# Patient Record
Sex: MALE | Race: WHITE | NOT HISPANIC OR LATINO | Employment: OTHER | URBAN - METROPOLITAN AREA
[De-identification: names, ages, dates, MRNs, and addresses within clinical notes are randomized per-mention and may not be internally consistent; named-entity substitution may affect disease eponyms.]

---

## 2017-01-02 ENCOUNTER — APPOINTMENT (OUTPATIENT)
Dept: CARDIAC REHAB | Age: 57
End: 2017-01-02
Payer: COMMERCIAL

## 2017-01-02 PROCEDURE — 93798 PHYS/QHP OP CAR RHAB W/ECG: CPT

## 2017-01-04 ENCOUNTER — APPOINTMENT (OUTPATIENT)
Dept: CARDIAC REHAB | Age: 57
End: 2017-01-04
Payer: COMMERCIAL

## 2017-01-04 PROCEDURE — 93798 PHYS/QHP OP CAR RHAB W/ECG: CPT

## 2017-01-06 ENCOUNTER — APPOINTMENT (OUTPATIENT)
Dept: CARDIAC REHAB | Age: 57
End: 2017-01-06
Payer: COMMERCIAL

## 2017-01-06 PROCEDURE — 93798 PHYS/QHP OP CAR RHAB W/ECG: CPT

## 2017-01-09 ENCOUNTER — APPOINTMENT (OUTPATIENT)
Dept: CARDIAC REHAB | Age: 57
End: 2017-01-09
Payer: COMMERCIAL

## 2017-01-09 ENCOUNTER — GENERIC CONVERSION - ENCOUNTER (OUTPATIENT)
Dept: OTHER | Facility: OTHER | Age: 57
End: 2017-01-09

## 2017-01-09 PROCEDURE — 93798 PHYS/QHP OP CAR RHAB W/ECG: CPT

## 2017-01-11 ENCOUNTER — APPOINTMENT (OUTPATIENT)
Dept: CARDIAC REHAB | Age: 57
End: 2017-01-11
Payer: COMMERCIAL

## 2017-01-11 PROCEDURE — 93798 PHYS/QHP OP CAR RHAB W/ECG: CPT

## 2017-01-13 ENCOUNTER — APPOINTMENT (OUTPATIENT)
Dept: CARDIAC REHAB | Age: 57
End: 2017-01-13
Payer: COMMERCIAL

## 2017-01-13 PROCEDURE — 93798 PHYS/QHP OP CAR RHAB W/ECG: CPT

## 2017-01-16 ENCOUNTER — APPOINTMENT (OUTPATIENT)
Dept: CARDIAC REHAB | Age: 57
End: 2017-01-16
Payer: COMMERCIAL

## 2017-01-16 PROCEDURE — 93798 PHYS/QHP OP CAR RHAB W/ECG: CPT

## 2017-01-18 ENCOUNTER — APPOINTMENT (OUTPATIENT)
Dept: CARDIAC REHAB | Age: 57
End: 2017-01-18
Payer: COMMERCIAL

## 2017-01-18 PROCEDURE — 93798 PHYS/QHP OP CAR RHAB W/ECG: CPT

## 2017-01-20 ENCOUNTER — APPOINTMENT (OUTPATIENT)
Dept: CARDIAC REHAB | Age: 57
End: 2017-01-20
Payer: COMMERCIAL

## 2017-01-20 PROCEDURE — 93798 PHYS/QHP OP CAR RHAB W/ECG: CPT

## 2017-01-23 ENCOUNTER — APPOINTMENT (OUTPATIENT)
Dept: CARDIAC REHAB | Age: 57
End: 2017-01-23
Payer: COMMERCIAL

## 2017-01-23 PROCEDURE — 93798 PHYS/QHP OP CAR RHAB W/ECG: CPT

## 2017-01-25 ENCOUNTER — APPOINTMENT (OUTPATIENT)
Dept: CARDIAC REHAB | Age: 57
End: 2017-01-25
Payer: COMMERCIAL

## 2017-01-25 PROCEDURE — 93798 PHYS/QHP OP CAR RHAB W/ECG: CPT

## 2017-01-27 ENCOUNTER — APPOINTMENT (OUTPATIENT)
Dept: CARDIAC REHAB | Age: 57
End: 2017-01-27
Payer: COMMERCIAL

## 2017-01-27 PROCEDURE — 93798 PHYS/QHP OP CAR RHAB W/ECG: CPT

## 2017-01-30 ENCOUNTER — APPOINTMENT (OUTPATIENT)
Dept: CARDIAC REHAB | Age: 57
End: 2017-01-30
Payer: COMMERCIAL

## 2017-01-30 PROCEDURE — 93798 PHYS/QHP OP CAR RHAB W/ECG: CPT

## 2017-02-01 ENCOUNTER — APPOINTMENT (OUTPATIENT)
Dept: CARDIAC REHAB | Age: 57
End: 2017-02-01
Payer: COMMERCIAL

## 2017-02-01 PROCEDURE — 93798 PHYS/QHP OP CAR RHAB W/ECG: CPT

## 2017-02-03 ENCOUNTER — APPOINTMENT (OUTPATIENT)
Dept: CARDIAC REHAB | Age: 57
End: 2017-02-03
Payer: COMMERCIAL

## 2017-02-03 PROCEDURE — 93798 PHYS/QHP OP CAR RHAB W/ECG: CPT

## 2017-02-06 ENCOUNTER — APPOINTMENT (OUTPATIENT)
Dept: CARDIAC REHAB | Age: 57
End: 2017-02-06
Payer: COMMERCIAL

## 2017-02-06 PROCEDURE — 93798 PHYS/QHP OP CAR RHAB W/ECG: CPT

## 2017-02-08 ENCOUNTER — APPOINTMENT (OUTPATIENT)
Dept: CARDIAC REHAB | Age: 57
End: 2017-02-08
Payer: COMMERCIAL

## 2017-02-08 PROCEDURE — 93798 PHYS/QHP OP CAR RHAB W/ECG: CPT

## 2017-02-10 ENCOUNTER — APPOINTMENT (OUTPATIENT)
Dept: CARDIAC REHAB | Age: 57
End: 2017-02-10
Payer: COMMERCIAL

## 2017-02-10 PROCEDURE — 93798 PHYS/QHP OP CAR RHAB W/ECG: CPT

## 2017-02-13 ENCOUNTER — APPOINTMENT (OUTPATIENT)
Dept: CARDIAC REHAB | Age: 57
End: 2017-02-13
Payer: COMMERCIAL

## 2017-02-13 PROCEDURE — 93798 PHYS/QHP OP CAR RHAB W/ECG: CPT

## 2017-02-15 ENCOUNTER — APPOINTMENT (OUTPATIENT)
Dept: CARDIAC REHAB | Age: 57
End: 2017-02-15
Payer: COMMERCIAL

## 2017-02-15 PROCEDURE — 93798 PHYS/QHP OP CAR RHAB W/ECG: CPT

## 2017-02-16 ENCOUNTER — APPOINTMENT (OUTPATIENT)
Dept: CARDIAC REHAB | Age: 57
End: 2017-02-16
Payer: COMMERCIAL

## 2017-02-16 PROCEDURE — 93798 PHYS/QHP OP CAR RHAB W/ECG: CPT

## 2017-02-17 ENCOUNTER — APPOINTMENT (OUTPATIENT)
Dept: CARDIAC REHAB | Age: 57
End: 2017-02-17
Payer: COMMERCIAL

## 2017-02-20 ENCOUNTER — APPOINTMENT (OUTPATIENT)
Dept: CARDIAC REHAB | Age: 57
End: 2017-02-20
Payer: COMMERCIAL

## 2017-02-20 PROCEDURE — 93798 PHYS/QHP OP CAR RHAB W/ECG: CPT

## 2017-02-22 ENCOUNTER — APPOINTMENT (OUTPATIENT)
Dept: CARDIAC REHAB | Age: 57
End: 2017-02-22
Payer: COMMERCIAL

## 2017-02-22 PROCEDURE — 93798 PHYS/QHP OP CAR RHAB W/ECG: CPT

## 2017-02-23 ENCOUNTER — GENERIC CONVERSION - ENCOUNTER (OUTPATIENT)
Dept: OTHER | Facility: OTHER | Age: 57
End: 2017-02-23

## 2017-02-24 ENCOUNTER — APPOINTMENT (OUTPATIENT)
Dept: CARDIAC REHAB | Age: 57
End: 2017-02-24
Payer: COMMERCIAL

## 2017-02-24 PROCEDURE — 93798 PHYS/QHP OP CAR RHAB W/ECG: CPT

## 2017-02-27 ENCOUNTER — APPOINTMENT (OUTPATIENT)
Dept: CARDIAC REHAB | Age: 57
End: 2017-02-27
Payer: COMMERCIAL

## 2017-02-27 ENCOUNTER — GENERIC CONVERSION - ENCOUNTER (OUTPATIENT)
Dept: OTHER | Facility: OTHER | Age: 57
End: 2017-02-27

## 2017-02-27 PROCEDURE — 93798 PHYS/QHP OP CAR RHAB W/ECG: CPT

## 2017-02-28 ENCOUNTER — GENERIC CONVERSION - ENCOUNTER (OUTPATIENT)
Dept: OTHER | Facility: OTHER | Age: 57
End: 2017-02-28

## 2017-03-01 ENCOUNTER — APPOINTMENT (OUTPATIENT)
Dept: CARDIAC REHAB | Age: 57
End: 2017-03-01
Payer: COMMERCIAL

## 2017-03-01 ENCOUNTER — ALLSCRIPTS OFFICE VISIT (OUTPATIENT)
Dept: OTHER | Facility: OTHER | Age: 57
End: 2017-03-01

## 2017-03-01 PROCEDURE — 93798 PHYS/QHP OP CAR RHAB W/ECG: CPT

## 2017-03-03 ENCOUNTER — APPOINTMENT (OUTPATIENT)
Dept: CARDIAC REHAB | Age: 57
End: 2017-03-03
Payer: COMMERCIAL

## 2017-03-06 ENCOUNTER — APPOINTMENT (OUTPATIENT)
Dept: CARDIAC REHAB | Age: 57
End: 2017-03-06
Payer: COMMERCIAL

## 2017-03-06 ENCOUNTER — GENERIC CONVERSION - ENCOUNTER (OUTPATIENT)
Dept: OTHER | Facility: OTHER | Age: 57
End: 2017-03-06

## 2017-09-11 ENCOUNTER — ALLSCRIPTS OFFICE VISIT (OUTPATIENT)
Dept: OTHER | Facility: OTHER | Age: 57
End: 2017-09-11

## 2017-09-11 DIAGNOSIS — E78.5 HYPERLIPIDEMIA: ICD-10-CM

## 2017-10-06 ENCOUNTER — LAB CONVERSION - ENCOUNTER (OUTPATIENT)
Dept: OTHER | Facility: OTHER | Age: 57
End: 2017-10-06

## 2017-10-06 LAB
A/G RATIO (HISTORICAL): 1.7 (CALC) (ref 1–2.5)
ALBUMIN SERPL BCP-MCNC: 4.2 G/DL (ref 3.6–5.1)
ALP SERPL-CCNC: 55 U/L (ref 40–115)
ALT SERPL W P-5'-P-CCNC: 19 U/L (ref 9–46)
AST SERPL W P-5'-P-CCNC: 19 U/L (ref 10–35)
BILIRUB SERPL-MCNC: 1.1 MG/DL (ref 0.2–1.2)
BUN SERPL-MCNC: 17 MG/DL (ref 7–25)
BUN/CREA RATIO (HISTORICAL): NORMAL (CALC) (ref 6–22)
CALCIUM SERPL-MCNC: 9.4 MG/DL (ref 8.6–10.3)
CHLORIDE SERPL-SCNC: 104 MMOL/L (ref 98–110)
CHOLEST SERPL-MCNC: 137 MG/DL
CHOLEST/HDLC SERPL: 3.3 (CALC)
CO2 SERPL-SCNC: 29 MMOL/L (ref 20–31)
CREAT SERPL-MCNC: 1.07 MG/DL (ref 0.7–1.33)
EGFR AFRICAN AMERICAN (HISTORICAL): 90 ML/MIN/1.73M2
EGFR-AMERICAN CALC (HISTORICAL): 78 ML/MIN/1.73M2
GAMMA GLOBULIN (HISTORICAL): 2.5 G/DL (CALC) (ref 1.9–3.7)
GLUCOSE (HISTORICAL): 93 MG/DL (ref 65–99)
HDLC SERPL-MCNC: 41 MG/DL
LDL CHOLESTEROL (HISTORICAL): 75 MG/DL (CALC)
NON-HDL-CHOL (CHOL-HDL) (HISTORICAL): 96 MG/DL (CALC)
POTASSIUM SERPL-SCNC: 4.5 MMOL/L (ref 3.5–5.3)
SODIUM SERPL-SCNC: 138 MMOL/L (ref 135–146)
TOTAL PROTEIN (HISTORICAL): 6.7 G/DL (ref 6.1–8.1)
TRIGL SERPL-MCNC: 119 MG/DL

## 2017-10-10 ENCOUNTER — GENERIC CONVERSION - ENCOUNTER (OUTPATIENT)
Dept: OTHER | Facility: OTHER | Age: 57
End: 2017-10-10

## 2017-10-26 NOTE — PROGRESS NOTES
Assessment  Assessed    1  CAD (coronary artery disease) (414 00) (I25 10)   2  CABG   3  Benign essential hypertension (401 1) (I10)   4  Dyslipidemia (272 4) (E78 5)    Plan  Dyslipidemia    · Atorvastatin Calcium 40 MG Oral Tablet; TAKE 1 TABLET AT BEDTIME   Rx By: Marily Hidalgo; Dispense: 90 Days ; #:90 Tablet; Refill: 3;For: Dyslipidemia; SHERRY = N; Verified Transmission to [employer]; Last Updated By: System, [employer]; 9/11/2017 1:59:35 PM   · (1) COMPREHENSIVE METABOLIC PANEL; Status:Active; Requested for:03Qwf0643;    Perform:[facility]; Due:87Sfc1060; Ordered; For:Dyslipidemia; Ordered By:Pancho Burnette;   · (1) LIPID PANEL FASTING W DIRECT LDL REFLEX; Status:Active; Requested  for:33Uif2307;    Perform:[facility]; Due:50Kpt0030; Ordered; For:Dyslipidemia; Ordered By:Cielo Burnette;   · Follow-up visit in 6 months Evaluation and Treatment  Follow-up  Status: Complete   Done: 41XEN9064   Ordered; For: Dyslipidemia; Ordered By: Marily Hidalgo Performed:  Due: 00JGF1826; Last Updated By: Jhony Graff; 9/11/2017 1:57:04 PM    Discussion/Summary  Cardiology Discussion Summary Free Text Note Form [facility]:   1  Coronary Artery Disease: S/P CABG  LDL is 56  Continue aspirin, metoprolol, and Atorvastatin  He is asymptomatic  Hypertension: Continue Metoprolol  Dyslipidemia: Continue Atorvastatin  LDL is well controlled  LDL 56  Recheck lipids on 40mg of atorvastatin  Counseling Documentation With Imm: The patient was counseled regarding diagnostic results,-- instructions for management,-- risk factor reductions,-- impressions  total time of encounter was 25 minutes-- and-- 15 minutes was spent counseling  Chief Complaint  Chief Complaint Free Text Note Form: Patient is here for a follow up  Patient complaints of occasional sob  History of Present Illness  Cardiology HPI Free Text Note Form [facility]: Followup for CAD  well   No chest pain, no dyspnea, no palpitations  Coronary Artery Disease (Follow-Up): The patient states he has been stable with his coronary artery disease symptoms since the last visit  Symptoms: denies chest pain when at rest,-- denies exertional chest pain,-- denies dyspnea,-- denies fatigue-- and-- denies exercise intolerance  Review of Systems  Cardiology Male ROS:     Cardiac: No complaints of chest pain, no palpitations, no fainiting  Skin: No complaints of nonhealing sores or skin rash  Genitourinary: No complaints of recurrent urinary tract infections, frequent urination at night, difficult urination, blood in urine, kidney stones, loss of bladder control, no kidney or prostate problems, no erectile dysfunction  Psychological: No complaints of feeling depressed, anxiety, panic attacks, or difficulty concentrating  General: No complaints of trouble sleeping, lack of energy, fatigue, appetite changes, weight changes, fever, frequent infections, or night sweats  Respiratory: No complaints of shortness of breath, cough with sputum, or wheezing  HEENT: No complaints of serious problems, hearing problems, nose problems, throat problems, or snoring  Gastrointestinal: No complaints of liver problems, nausea, vomiting, heartburn, constipation, bloody stools, diarrhea, problems swallowing, adbominal pain, or rectal bleeding  Hematologic: No complaints of bleeding disorders, anemia, blood clots, or excessive brusing  Neurological: No complaints of numbness, tingling, dizziness, weakness, seizures, headaches, syncope or fainting, AM fatigue, daytime sleepiness, no witnessed apnea episodes  Musculoskeletal: No complaints of arthritis, back pain, or painfull swelling  ROS Reviewed:   ROS reviewed  Active Problems  Problems    1  Abnormal stress test (794 39) (R94 39)   2  Benign essential hypertension (401 1) (I10)   3  CABG   4  CAD (coronary artery disease) (414 00) (I25 10)   5  Chest pain (786 50) (R07 9)   6  Dyslipidemia (272 4) (E78 5)   7  Hemianopsia (368 46) (H53 47)   8  Postop check (V67 00) (Z09)   9  Shortness of breath (786 05) (R06 02)   10  Snoring (786 09) (R06 83)    Past Medical History  Active Problems And Past Medical History Reviewed: The active problems and past medical history were reviewed and updated today  Surgical History  Problems    1  CABG  Surgical History Reviewed: The surgical history was reviewed and updated today  Family History  Father    1  Family history of hypertension (V17 49) (Z82 49)  Family History    2  Family history of Bladder cancer (188 9) (C67 9)   3  Family history of Cervical cancer (180 9) (C53 9)  Family History Reviewed: The family history was reviewed and updated today  Social History  Problems    · Full-time employment   ·    · Never a smoker   · No alcohol use   · No drug use   · Self-employed  Social History Reviewed: The social history was reviewed and updated today  Current Meds   1  Aspirin 81 MG Oral Tablet Chewable; CHEW 1 TABLET AND SWALLOW ORALLY DAILY   (HYPERTENSION); Therapy: 79AYZ2089 to (Evaluate:84Atm0949); Last LS:68TLV6357 Ordered   2  Atorvastatin Calcium 80 MG Oral Tablet; TAKE 1/2 A TABLET BY MOUTH IN THE   EVENING; Therapy: 87RWH2218 to (Evaluate:50Avq2041)  Requested for: 11Sep2017 Recorded   3  Docusate Sodium 100 MG Oral Capsule Recorded   4  Iron 325 (65 Fe) MG Oral Tablet; TAKE 1 TABLET DAILY; Therapy: 57YFR7668 to Recorded   5  Metoprolol Succinate ER 25 MG Oral Tablet Extended Release 24 Hour; Take 1 tablet   daily; Therapy: 34SLD2295 to (Evaluate:66Kak9076)  Requested for: 18JHY5626; Last   Rx:01Mar2017 Ordered  Medication List Reviewed: The medication list was reviewed and updated today  Allergies  Medication    1   No Known Drug Allergies    Vitals  Vital Signs    Recorded: 11Sep2017 01:40PM   Heart Rate 74   Systolic 705, RUE, Sitting   Diastolic 80, RUE, Sitting   Height 5 ft 5 in Weight 155 lb    BMI Calculated 25 79   BSA Calculated 1 78     Physical Exam    Constitutional   General appearance: No acute distress, well appearing and well nourished  Eyes   Conjunctiva and Sclera examination: Conjunctiva pink, sclera anicteric  Ears, Nose, Mouth, and Throat - Oropharynx: Clear, nares are clear, mucous membranes are moist    Neck   Neck and thyroid: Normal, supple, trachea midline, no thyromegaly  Pulmonary   Respiratory effort: No increased work of breathing or signs of respiratory distress  Auscultation of lungs: Clear to auscultation, no rales, no rhonchi, no wheezing, good air movement  Cardiovascular   Auscultation of heart: Normal rate and rhythm, normal S1 and S2, no murmurs  Carotid pulses: Normal, 2+ bilaterally  Peripheral vascular exam: Normal pulses throughout, no tenderness, erythema or swelling  Pedal pulses: Normal, 2+ bilaterally  Examination of extremities for edema and/or varicosities: Normal     Abdomen   Abdomen: Non-tender and no distention  Liver and spleen: No hepatomegaly or splenomegaly  Musculoskeletal Gait and station: Normal gait  -- Digits and nails: Normal without clubbing or cyanosis  -- Inspection/palpation of joints, bones, and muscles: Normal, ROM normal     Skin - Skin and subcutaneous tissue: Normal without rashes or lesions  Skin is warm and well perfused, normal turgor  Neurologic - Cranial nerves: II - XII intact  -- Speech: Normal     Psychiatric - Orientation to person, place, and time: Normal -- Mood and affect: Normal       Signatures   Electronically signed by : VITA Rose ; Sep 11 2017  2:43PM EST                       (Author)

## 2018-01-12 NOTE — MISCELLANEOUS
History of Present Illness  Cardiac Surgery Phone Follow-up:    This phone call was in regards to post-operative care  Procedure: CABG x 2  10/10/16   Hospital Discharge Date: 10/13/16  Surgeon: Deepthi Cardoza MD    Office Visit Pending: Yes   11/17/16  The patient has an appointment with their cardiologist on: 11/10/16  Date of Call: 10/18/2016, follow-up call after discharge  Symptom review with the patient is as follows: no shortness of breath, no chest pain, no leg swelling, pain is controlled, incision stable, no bowel problems, stable appetite, no lightheaded/dizziness, blood pressure reading: systolic 876-936, activity: tolerating activity, patient's reported pre-op weight was 153 lbs, patient's reported weight at discharge was 158 lbs, patient's reported current weight is 146 lbs and medication review  Comments: Spoke to wife today  Cll was made over the weekend and wife spoke to on-call surgeon Dr Aurelia Barr regarding fogginess in right eye  No change in sight  Patient has appt with opthalmology later this week  He feels tired with activity but denies SOB, angina or lightheadedness  He has lost 12 lbs since returning home ( 7 lbs below preop weight)  No edema  Incisions healing well  Meds reviewed  Plan: 1  Stop torsemide and potassium  2  Continue daily weight, call for weight gain 2 lbs or more in 24 hours  3  Take Imdur at bedtime  4  Continue activity, increase as tolerated  5  Continue us of IS  6  Call [facility] office with questions  Active Problems    1  Abnormal stress test (794 39) (R94 39)   2  Benign essential hypertension (401 1) (I10)   3  CAD in native artery (414 01) (I25 10)   4  Chest pain (786 50) (R07 9)   5  Dyslipidemia (272 4) (E78 5)   6  Snoring (786 09) (R06 83)    Family History  Problems    1  Family history of Bladder cancer (188 9) (C67 9)   2  Family history of Cervical cancer (180 9) (C53 9)   3   Family history of hypertension (V17 49) (Z82 49) : Father    Social History    · Full-time employment   ·    · Never a smoker   · No alcohol use   · No drug use   · Self-employed    Current Meds    1  Aspirin 81 MG Oral Tablet Chewable; CHEW 1 TABLET AND SWALLOW ORALLY DAILY   (HYPERTENSION); Therapy: 50TDL4787 to (Evaluate:91Uzt7047); Last CQ:32EXM1387 Ordered   2  Lisinopril 5 MG Oral Tablet; TAKE 1 TABLET DAILY; Therapy: 93LNE1319 to (22 016830)  Requested for: 67YUP5548; Last   Rx:27Jun2016 Ordered   3  Metoprolol Succinate ER 25 MG Oral Tablet Extended Release 24 Hour (Toprol XL); Take   1 tablet twice daily; Therapy: 89OCB0051 to (Evaluate:83Wtg0745)  Requested for: 95HJP3864; Last   BI:04ECN1647 Ordered   4  Nitrostat 0 4 MG Sublingual Tablet Sublingual (Nitroglycerin); DISSOLVE 1 TABLET   UNDER THE TONGUE AS NEEDED FOR CHEST PAIN;   Therapy: 51SYM1621 to (Last AK:38FME1471)  Requested for: 64YVH4233 Ordered    5  Atorvastatin Calcium 40 MG Oral Tablet; TAKE 1 TABLET AT BEDTIME;    Therapy: 69YQM4049 to (Evaluate:25Jun2017)  Requested for: 78PTI1716; Last   AU:08LIG0753 Ordered    Signatures   Electronically signed by : DEE Hernandez; Oct 18 2016  4:09PM EST                       (Author)

## 2018-01-12 NOTE — RESULT NOTES
Verified Results  (1) COMPREHENSIVE METABOLIC PANEL 26FND2186 01:62ZJ Sree Rincon   REPORT COMMENT:  FASTING:YES     Test Name Result Flag Reference   GLUCOSE 93 mg/dL  65-99   Fasting reference interval   UREA NITROGEN (BUN) 17 mg/dL  7-25   CREATININE 1 07 mg/dL  0 70-1 33   For patients >52years of age, the reference limit  for Creatinine is approximately 13% higher for people  identified as -American  eGFR NON-AFR  AMERICAN 78 mL/min/1 73m2  > OR = 60   eGFR AFRICAN AMERICAN 90 mL/min/1 73m2  > OR = 60   BUN/CREATININE RATIO   1-68   NOT APPLICABLE (calc)   SODIUM 138 mmol/L  135-146   POTASSIUM 4 5 mmol/L  3 5-5 3   CHLORIDE 104 mmol/L     CARBON DIOXIDE 29 mmol/L  20-31   CALCIUM 9 4 mg/dL  8 6-10 3   PROTEIN, TOTAL 6 7 g/dL  6 1-8 1   ALBUMIN 4 2 g/dL  3 6-5 1   GLOBULIN 2 5 g/dL (calc)  1 9-3 7   ALBUMIN/GLOBULIN RATIO 1 7 (calc)  1 0-2 5   BILIRUBIN, TOTAL 1 1 mg/dL  0 2-1 2   ALKALINE PHOSPHATASE 55 U/L     AST 19 U/L  10-35   ALT 19 U/L  9-46     (Q) LIPID PANEL WITH REFLEX TO DIRECT LDL 06Oct2017 07:02AM Sree Rincon     Test Name Result Flag Reference   CHOLESTEROL, TOTAL 137 mg/dL  <200   HDL CHOLESTEROL 41 mg/dL  >23   TRIGLICERIDES 298 mg/dL  <150   LDL-CHOLESTEROL 75 mg/dL (calc)     Reference range: <100     Desirable range <100 mg/dL for patients with CHD or  diabetes and <70 mg/dL for diabetic patients with  known heart disease  LDL-C is now calculated using the Naveed-Arce   calculation, which is a validated novel method providing   better accuracy than the Friedewald equation in the   estimation of LDL-C  Mariza Aquino  Brandon Smack  0354;407(81): [phone]   (http://OneMedNet/faq/ZXZ582)   CHOL/HDLC RATIO 3 3 (calc)  <5 0   NON HDL CHOLESTEROL 96 mg/dL (calc)  <130   For patients with diabetes plus 1 major ASCVD risk   factor, treating to a non-HDL-C goal of <100 mg/dL   (LDL-C of <70 mg/dL) is considered a therapeutic   option

## 2018-01-13 NOTE — CONSULTS
Chief Complaint  Patient is here for a follow up  Patient complaints of occasional sob  History of Present Illness  Followup for CAD    Doing well  No chest pain, no dyspnea, no palpitations  The patient states he has been stable with his coronary artery disease symptoms since the last visit  Symptoms: denies chest pain when at rest, denies exertional chest pain, denies dyspnea, denies fatigue and denies exercise intolerance  Review of Systems      Cardiac: No complaints of chest pain, no palpitations, no fainiting  Skin: No complaints of nonhealing sores or skin rash  Genitourinary: No complaints of recurrent urinary tract infections, frequent urination at night, difficult urination, blood in urine, kidney stones, loss of bladder control, no kidney or prostate problems, no erectile dysfunction  Psychological: No complaints of feeling depressed, anxiety, panic attacks, or difficulty concentrating  General: No complaints of trouble sleeping, lack of energy, fatigue, appetite changes, weight changes, fever, frequent infections, or night sweats  Respiratory: No complaints of shortness of breath, cough with sputum, or wheezing  HEENT: No complaints of serious problems, hearing problems, nose problems, throat problems, or snoring  Gastrointestinal: No complaints of liver problems, nausea, vomiting, heartburn, constipation, bloody stools, diarrhea, problems swallowing, adbominal pain, or rectal bleeding  Hematologic: No complaints of bleeding disorders, anemia, blood clots, or excessive brusing  Neurological: No complaints of numbness, tingling, dizziness, weakness, seizures, headaches, syncope or fainting, AM fatigue, daytime sleepiness, no witnessed apnea episodes  Musculoskeletal: No complaints of arthritis, back pain, or painfull swelling  ROS reviewed  Active Problems    1  Abnormal stress test (794 39) (R94 39)   2  Benign essential hypertension (401 1) (I10)   3   CABG 4  CAD (coronary artery disease) (414 00) (I25 10)   5  Chest pain (786 50) (R07 9)   6  Dyslipidemia (272 4) (E78 5)   7  Hemianopsia (368 46) (H53 47)   8  Postop check (V67 00) (Z09)   9  Shortness of breath (786 05) (R06 02)   10  Snoring (786 09) (R06 83)    Past Medical History    The active problems and past medical history were reviewed and updated today  Surgical History    · CABG    The surgical history was reviewed and updated today  Family History    · Family history of hypertension (V17 49) (Z82 49)    · Family history of Bladder cancer (188 9) (C67 9)   · Family history of Cervical cancer (180 9) (C53 9)    The family history was reviewed and updated today  Social History    · Full-time employment   ·    · Never a smoker   · No alcohol use   · No drug use   · Self-employed  The social history was reviewed and updated today  Current Meds   1  Aspirin 81 MG Oral Tablet Chewable; CHEW 1 TABLET AND SWALLOW ORALLY DAILY   (HYPERTENSION); Therapy: 29WNA6021 to (Evaluate:80Yfp5667); Last DO:45BYO0550 Ordered   2  Atorvastatin Calcium 80 MG Oral Tablet; TAKE 1/2 A TABLET BY MOUTH IN THE   EVENING; Therapy: 43KCQ7896 to (Evaluate:53Ffq3303)  Requested for: 11Sep2017 Recorded   3  Docusate Sodium 100 MG Oral Capsule Recorded   4  Iron 325 (65 Fe) MG Oral Tablet; TAKE 1 TABLET DAILY; Therapy: 46YAE9963 to Recorded   5  Metoprolol Succinate ER 25 MG Oral Tablet Extended Release 24 Hour; Take 1 tablet   daily; Therapy: 88ZSK6320 to (Evaluate:64Lrl2862)  Requested for: 79YZJ8148; Last   Rx:01Mar2017 Ordered    The medication list was reviewed and updated today  Allergies    1   No Known Drug Allergies    Vitals   Recorded: 11Sep2017 01:40PM   Heart Rate 74   Systolic 441, RUE, Sitting   Diastolic 80, RUE, Sitting   Height 5 ft 5 in   Weight 155 lb    BMI Calculated 25 79   BSA Calculated 1 78     Physical Exam    Constitutional   General appearance: No acute distress, well appearing and well nourished  Eyes   Conjunctiva and Sclera examination: Conjunctiva pink, sclera anicteric  Ears, Nose, Mouth, and Throat - Oropharynx: Clear, nares are clear, mucous membranes are moist    Neck   Neck and thyroid: Normal, supple, trachea midline, no thyromegaly  Pulmonary   Respiratory effort: No increased work of breathing or signs of respiratory distress  Auscultation of lungs: Clear to auscultation, no rales, no rhonchi, no wheezing, good air movement  Cardiovascular   Auscultation of heart: Normal rate and rhythm, normal S1 and S2, no murmurs  Carotid pulses: Normal, 2+ bilaterally  Peripheral vascular exam: Normal pulses throughout, no tenderness, erythema or swelling  Pedal pulses: Normal, 2+ bilaterally  Examination of extremities for edema and/or varicosities: Normal     Abdomen   Abdomen: Non-tender and no distention  Liver and spleen: No hepatomegaly or splenomegaly  Musculoskeletal Gait and station: Normal gait  Digits and nails: Normal without clubbing or cyanosis  Inspection/palpation of joints, bones, and muscles: Normal, ROM normal     Skin - Skin and subcutaneous tissue: Normal without rashes or lesions  Skin is warm and well perfused, normal turgor  Neurologic - Cranial nerves: II - XII intact  Speech: Normal     Psychiatric - Orientation to person, place, and time: Normal  Mood and affect: Normal       Assessment    1  CAD (coronary artery disease) (414 00) (I25 10)   2  CABG   3  Benign essential hypertension (401 1) (I10)   4  Dyslipidemia (272 4) (E78 5)    Plan  Dyslipidemia    · Atorvastatin Calcium 40 MG Oral Tablet; TAKE 1 TABLET AT BEDTIME   Rx By: Kristofer Brito; Dispense: 90 Days ; #:90 Tablet; Refill: 3; For: Dyslipidemia; SHERRY = N; Verified Transmission to [employer]; Last Updated By: System, [employer]; 9/11/2017 1:59:35 PM   · (1) COMPREHENSIVE METABOLIC PANEL; Status:Active;  Requested for:56Zfy3562; Perform:[facility]; Due:55Wrv9329; Ordered; For:Dyslipidemia; Ordered By:Pancho Burnette;   · (1) LIPID PANEL FASTING W DIRECT LDL REFLEX; Status:Active; Requested  for:99Dgv5506;    Perform:[facility]; Due:95Wyf3214; Ordered; For:Dyslipidemia; Ordered By:Farzana Burnette;   · Follow-up visit in 6 months Evaluation and Treatment  Follow-up  Status: Complete   Done: 51MWS9070   Ordered; For: Dyslipidemia; Ordered By: Fei Carey Performed:  Due: 70AHZ0222; Last Updated By: Meño Rodriguez; 9/11/2017 1:57:04 PM    Discussion/Summary    1  Coronary Artery Disease: S/P CABG  LDL is 56  Continue aspirin, metoprolol, and Atorvastatin  He is asymptomatic  2  Hypertension: Continue Metoprolol  3  Dyslipidemia: Continue Atorvastatin  LDL is well controlled  LDL 56  Recheck lipids on 40mg of atorvastatin  The patient was counseled regarding diagnostic results, instructions for management, risk factor reductions, impressions  total time of encounter was 25 minutes and 15 minutes was spent counseling        Signatures   Electronically signed by : VITA Huntley ; Sep 11 2017  2:43PM EST                       (Author)

## 2018-01-14 VITALS
HEIGHT: 65 IN | WEIGHT: 155 LBS | SYSTOLIC BLOOD PRESSURE: 122 MMHG | DIASTOLIC BLOOD PRESSURE: 80 MMHG | BODY MASS INDEX: 25.83 KG/M2 | HEART RATE: 74 BPM

## 2018-01-14 VITALS
SYSTOLIC BLOOD PRESSURE: 122 MMHG | HEIGHT: 65 IN | WEIGHT: 155.5 LBS | DIASTOLIC BLOOD PRESSURE: 82 MMHG | BODY MASS INDEX: 25.91 KG/M2 | HEART RATE: 72 BPM

## 2018-02-09 ENCOUNTER — TELEPHONE (OUTPATIENT)
Dept: CARDIOLOGY CLINIC | Facility: CLINIC | Age: 58
End: 2018-02-09

## 2018-03-10 LAB
ALBUMIN SERPL-MCNC: 4.4 G/DL (ref 3.6–5.1)
ALBUMIN/GLOB SERPL: 1.9 (CALC) (ref 1–2.5)
ALP SERPL-CCNC: 45 U/L (ref 40–115)
ALT SERPL-CCNC: 18 U/L (ref 9–46)
AST SERPL-CCNC: 19 U/L (ref 10–35)
BILIRUB SERPL-MCNC: 1 MG/DL (ref 0.2–1.2)
BUN SERPL-MCNC: 19 MG/DL (ref 7–25)
BUN/CREAT SERPL: NORMAL (CALC) (ref 6–22)
CALCIUM SERPL-MCNC: 9.5 MG/DL (ref 8.6–10.3)
CHLORIDE SERPL-SCNC: 104 MMOL/L (ref 98–110)
CO2 SERPL-SCNC: 28 MMOL/L (ref 20–31)
CREAT SERPL-MCNC: 1.01 MG/DL (ref 0.7–1.33)
GLOBULIN SER CALC-MCNC: 2.3 G/DL (CALC) (ref 1.9–3.7)
GLUCOSE SERPL-MCNC: 97 MG/DL (ref 65–99)
POTASSIUM SERPL-SCNC: 4.3 MMOL/L (ref 3.5–5.3)
PROT SERPL-MCNC: 6.7 G/DL (ref 6.1–8.1)
SL AMB EGFR AFRICAN AMERICAN: 97 ML/MIN/1.73M2
SL AMB EGFR NON AFRICAN AMERICAN: 83 ML/MIN/1.73M2
SODIUM SERPL-SCNC: 140 MMOL/L (ref 135–146)

## 2018-03-15 ENCOUNTER — OFFICE VISIT (OUTPATIENT)
Dept: CARDIOLOGY CLINIC | Facility: CLINIC | Age: 58
End: 2018-03-15
Payer: COMMERCIAL

## 2018-03-15 VITALS
SYSTOLIC BLOOD PRESSURE: 130 MMHG | HEIGHT: 65 IN | DIASTOLIC BLOOD PRESSURE: 82 MMHG | BODY MASS INDEX: 26.08 KG/M2 | OXYGEN SATURATION: 95 % | WEIGHT: 156.5 LBS | HEART RATE: 56 BPM

## 2018-03-15 DIAGNOSIS — I25.119 CORONARY ARTERY DISEASE WITH ANGINA PECTORIS, UNSPECIFIED VESSEL OR LESION TYPE, UNSPECIFIED WHETHER NATIVE OR TRANSPLANTED HEART (HCC): Primary | ICD-10-CM

## 2018-03-15 PROCEDURE — 99214 OFFICE O/P EST MOD 30 MIN: CPT | Performed by: INTERNAL MEDICINE

## 2018-03-15 PROCEDURE — 93000 ELECTROCARDIOGRAM COMPLETE: CPT | Performed by: INTERNAL MEDICINE

## 2018-03-15 RX ORDER — METOPROLOL SUCCINATE 25 MG/1
25 TABLET, EXTENDED RELEASE ORAL DAILY
COMMUNITY
End: 2018-03-15 | Stop reason: SDUPTHER

## 2018-03-15 RX ORDER — METOPROLOL SUCCINATE 25 MG/1
25 TABLET, EXTENDED RELEASE ORAL DAILY
Qty: 30 TABLET | Refills: 6 | Status: SHIPPED | OUTPATIENT
Start: 2018-03-15 | End: 2018-06-18 | Stop reason: SDUPTHER

## 2018-03-15 RX ORDER — ATORVASTATIN CALCIUM 40 MG/1
40 TABLET, FILM COATED ORAL EVERY EVENING
Qty: 30 TABLET | Refills: 6 | Status: SHIPPED | OUTPATIENT
Start: 2018-03-15 | End: 2018-06-18 | Stop reason: SDUPTHER

## 2018-03-15 NOTE — PROGRESS NOTES
Cardiology Follow Up    Jeffy Ward  1960  [id number]  [address] [place] [id number]    1  Coronary artery disease with angina pectoris, unspecified vessel or lesion type, unspecified whether native or transplanted heart [facility])  POCT ECG       Interval History:     Followup for CAD  Doing well  No chest pain, no dyspnea, no palpitations  Problem List     Essential hypertension (Chronic)    CAD (coronary artery disease) (Chronic)    Hyperlipidemia (Chronic)    Cellulitis of arm, right    S/P CABG x 2    Acute blood loss as cause of postoperative anemia    Thrombocytopenia (HCC)    Hyponatremia        Past Medical History:   Diagnosis Date    Angina at rest [facility]) 10/5/2016    Coronary artery disease     Hyperlipidemia     Hypertension      Social History     Social History    Marital status: /Civil Union     Spouse name: N/A    Number of children: N/A    Years of education: N/A     Occupational History    Not on file  Social History Main Topics    Smoking status: Never Smoker    Smokeless tobacco: Never Used    Alcohol use No    Drug use: No    Sexual activity: Not on file     Other Topics Concern    Not on file     Social History Narrative    No narrative on file      Family History   Problem Relation Age of Onset    Hypertension Father     Cancer Family      Past Surgical History:   Procedure Laterality Date    CORONARY ARTERY BYPASS GRAFT N/A 10/10/2016    Procedure: CORONARY ARTERY BYPASS GRAFT X2;  LIMA to OM;  Left SVG/EVH to PDA; ABDOUL;  Surgeon: Дмитрий Roa MD;  Location: [facility];  Service:        Current Outpatient Prescriptions:     aspirin 81 MG tablet, Take 81 mg by mouth daily, Disp: , Rfl:     atorvastatin (LIPITOR) 80 mg tablet, Take 1 tablet by mouth every evening (Patient taking differently: Take 40 mg by mouth every evening  ), Disp: 30 tablet, Rfl: 2    metoprolol succinate (TOPROL-XL) 25 mg 24 hr tablet, Take 25 mg by mouth daily, Disp: , Rfl:     clopidogrel (PLAVIX) 75 mg tablet, Take 1 tablet by mouth daily, Disp: 30 tablet, Rfl: 2    docusate sodium (COLACE) 100 mg capsule, Take 1 capsule by mouth 2 (two) times a day for 30 days Hold for soft stools  , Disp: 60 capsule, Rfl: 0  No Known Allergies    Labs:     Chemistry        Component Value Date/Time     10/06/2017 0702    K 4 5 10/06/2017 0702     10/06/2017 0702    CO2 29 10/06/2017 0702    BUN 19 03/09/2018 0630    CREATININE 1 01 03/09/2018 0630    CREATININE 1 07 10/06/2017 0702        Component Value Date/Time    CALCIUM 9 5 03/09/2018 0630    ALKPHOS 55 10/06/2017 0702    AST 19 10/06/2017 0702    ALT 19 10/06/2017 0702    BILITOT 1 1 10/06/2017 0702            Lab Results   Component Value Date    CHOL 137 10/06/2017     Lab Results   Component Value Date    HDL 41 10/06/2017     No results found for: [facility]  Lab Results   Component Value Date    TRIG 119 10/06/2017     No components found for: CHOLHDL    Imaging: No results found  EKG: Normal Sinus Rhythm  Normal ECG  ROS    Vitals:    03/15/18 1007   BP: 130/82   Pulse: 56   SpO2: 95%           Physical Exam   Constitutional: He is oriented to person, place, and time  No distress  HENT:   Mouth/Throat: No oropharyngeal exudate  Eyes: No scleral icterus  Neck: No JVD present  Cardiovascular: Normal rate and regular rhythm  No murmur heard  Pulmonary/Chest: Effort normal and breath sounds normal  No respiratory distress  He has no wheezes  He has no rales  Abdominal: Soft  Bowel sounds are normal  He exhibits no distension  There is no tenderness  There is no rebound  Musculoskeletal: He exhibits no edema  Neurological: He is alert and oriented to person, place, and time  Skin: Skin is warm  He is not diaphoretic  Psychiatric: He has a normal mood and affect   His behavior is normal  Discussion/Summary:    CAD: s/p CABG/  Continue  Current medical therapy  Lipids are well controlled  Plant based diet  The patient was counseled regarding diagnostic results, instructions for management, risk factor reductions, impressions  total time of encounter was 25 minutes and 15 minutes was spent counseling

## 2018-03-16 ENCOUNTER — TELEPHONE (OUTPATIENT)
Dept: CARDIOLOGY CLINIC | Facility: CLINIC | Age: 58
End: 2018-03-16

## 2018-03-16 NOTE — TELEPHONE ENCOUNTER
Zac called asking if he could have a lipid panel drawn  Only a cmp ordered and drawn prior to yesterday's ov  Last lipid panel was in Oct 2017  Please advise

## 2018-03-19 DIAGNOSIS — E78.5 HYPERLIPIDEMIA, UNSPECIFIED HYPERLIPIDEMIA TYPE: Primary | ICD-10-CM

## 2018-03-29 LAB
CHOLEST SERPL-MCNC: 138 MG/DL
CHOLEST/HDLC SERPL: 3.2 (CALC)
HDLC SERPL-MCNC: 43 MG/DL
LDLC SERPL CALC-MCNC: 73 MG/DL (CALC)
NONHDLC SERPL-MCNC: 95 MG/DL (CALC)
TRIGL SERPL-MCNC: 136 MG/DL

## 2018-06-18 DIAGNOSIS — I25.119 CORONARY ARTERY DISEASE WITH ANGINA PECTORIS, UNSPECIFIED VESSEL OR LESION TYPE, UNSPECIFIED WHETHER NATIVE OR TRANSPLANTED HEART (HCC): ICD-10-CM

## 2018-06-18 RX ORDER — METOPROLOL SUCCINATE 25 MG/1
25 TABLET, EXTENDED RELEASE ORAL DAILY
Qty: 30 TABLET | Refills: 6 | Status: SHIPPED | OUTPATIENT
Start: 2018-06-18

## 2018-06-18 RX ORDER — ATORVASTATIN CALCIUM 40 MG/1
40 TABLET, FILM COATED ORAL EVERY EVENING
Qty: 30 TABLET | Refills: 6 | Status: SHIPPED | OUTPATIENT
Start: 2018-06-18

## 2018-09-11 ENCOUNTER — OFFICE VISIT (OUTPATIENT)
Dept: CARDIOLOGY CLINIC | Facility: CLINIC | Age: 58
End: 2018-09-11
Payer: COMMERCIAL

## 2018-09-11 VITALS
HEIGHT: 65 IN | WEIGHT: 154 LBS | SYSTOLIC BLOOD PRESSURE: 126 MMHG | HEART RATE: 60 BPM | OXYGEN SATURATION: 99 % | DIASTOLIC BLOOD PRESSURE: 88 MMHG | BODY MASS INDEX: 25.66 KG/M2

## 2018-09-11 DIAGNOSIS — I25.10 CORONARY ARTERY DISEASE INVOLVING NATIVE CORONARY ARTERY OF NATIVE HEART WITHOUT ANGINA PECTORIS: Primary | ICD-10-CM

## 2018-09-11 PROCEDURE — 99214 OFFICE O/P EST MOD 30 MIN: CPT | Performed by: INTERNAL MEDICINE

## 2018-09-11 NOTE — PROGRESS NOTES
Cardiology Follow Up    Larry Larson  1960  [id number]  [address] [facility] [place] [id number] [place] [address] Sanjay Morel [address]  [phone]    No diagnosis found  Interval History: Followup for CAD    Doing well  No chest pain, no dyspnea and no palpitations  Problem List     Essential hypertension (Chronic)    CAD (coronary artery disease) (Chronic)    Hyperlipidemia (Chronic)    Cellulitis of arm, right    S/P CABG x 2    Acute blood loss as cause of postoperative anemia    Thrombocytopenia (HCC)    Hyponatremia        Past Medical History:   Diagnosis Date    Angina at rest [facility]) 10/5/2016    Coronary artery disease     Hyperlipidemia     Hypertension      Social History     Social History    Marital status: /Civil Union     Spouse name: N/A    Number of children: N/A    Years of education: N/A     Occupational History    Not on file  Social History Main Topics    Smoking status: Never Smoker    Smokeless tobacco: Never Used    Alcohol use No    Drug use: No    Sexual activity: Not on file     Other Topics Concern    Not on file     Social History Narrative    No narrative on file      Family History   Problem Relation Age of Onset    Hypertension Father     Hyperlipidemia Father         possible     Cancer Father     Cancer Family     Cancer Mother     Heart attack Neg Hx     Stroke Neg Hx     Anuerysm Neg Hx     Clotting disorder Neg Hx     Arrhythmia Neg Hx     Heart failure Neg Hx     Coronary artery disease Neg Hx     Sudden death Neg Hx         scd     Past Surgical History:   Procedure Laterality Date    CORONARY ARTERY BYPASS GRAFT N/A 10/10/2016    Procedure: CORONARY ARTERY BYPASS GRAFT X2;  LIMA to OM;  Left SVG/EVH to PDA; ABDOUL;  Surgeon: Marylen Glad, MD;  [facility];  Service:        Current Outpatient Prescriptions:     aspirin 81 MG tablet, Take 81 mg by mouth daily, Disp: , Rfl:     atorvastatin (LIPITOR) 40 mg tablet, Take 1 tablet (40 mg total) by mouth every evening, Disp: 30 tablet, Rfl: 6    Coenzyme Q10 (CO Q 10) 100 MG CAPS, Take 100 mg by mouth daily, Disp: , Rfl:     metoprolol succinate (TOPROL-XL) 25 mg 24 hr tablet, Take 1 tablet (25 mg total) by mouth daily, Disp: 30 tablet, Rfl: 6  No Known Allergies    Labs:     Chemistry        Component Value Date/Time     10/06/2017 0702    K 4 5 10/06/2017 0702     03/09/2018 0630    CO2 28 03/09/2018 0630    BUN 19 03/09/2018 0630    CREATININE 1 01 03/09/2018 0630    CREATININE 1 07 10/06/2017 0702        Component Value Date/Time    CALCIUM 9 5 03/09/2018 0630    ALKPHOS 45 03/09/2018 0630    AST 19 10/06/2017 0702    ALT 19 10/06/2017 0702    BILITOT 1 1 10/06/2017 0702            Lab Results   Component Value Date    CHOL 137 10/06/2017     Lab Results   Component Value Date    HDL 43 03/28/2018    HDL 41 10/06/2017     No results found for: [address]  Lab Results   Component Value Date    TRIG 136 03/28/2018    TRIG 119 10/06/2017     No components found for: CHOLHDL    Imaging: No results found  Review of Systems   Constitution: Negative  HENT: Negative  Eyes: Negative  Cardiovascular: Negative  Respiratory: Negative  Endocrine: Negative  Hematologic/Lymphatic: Negative  Skin: Negative  Musculoskeletal: Negative  Gastrointestinal: Negative  Genitourinary: Negative  Neurological: Negative  Psychiatric/Behavioral: Negative  Allergic/Immunologic: Negative  Vitals:    09/11/18 1246   BP: 126/88   Pulse: 60   SpO2: 99%           Physical Exam   Constitutional: He is oriented to person, place, and time  No distress  HENT:   Mouth/Throat: No oropharyngeal exudate  Eyes: No scleral icterus  Neck: No JVD present  Cardiovascular: Normal rate and regular rhythm  No murmur heard    Pulmonary/Chest: Effort normal and breath sounds normal  No respiratory distress  He has no wheezes  He has no rales  Abdominal: Soft  Bowel sounds are normal  He exhibits no distension  There is no tenderness  There is no rebound  Musculoskeletal: He exhibits no edema  Neurological: He is alert and oriented to person, place, and time  Skin: Skin is warm and dry  He is not diaphoretic  Psychiatric: He has a normal mood and affect  His behavior is normal        Discussion/Summary:    CAD s/p CABG: He has no angina  Continue with medical therapy  LDL 73  The patient was counseled regarding diagnostic results, instructions for management, risk factor reductions, impressions  total time of encounter was 25 minutes and 15 minutes was spent counseling

## 2018-10-25 LAB
ALBUMIN SERPL-MCNC: 4.3 G/DL (ref 3.6–5.1)
ALBUMIN/GLOB SERPL: 1.7 (CALC) (ref 1–2.5)
ALP SERPL-CCNC: 48 U/L (ref 40–115)
ALT SERPL-CCNC: 46 U/L (ref 9–46)
AST SERPL-CCNC: 29 U/L (ref 10–35)
BASOPHILS # BLD AUTO: 28 CELLS/UL (ref 0–200)
BASOPHILS NFR BLD AUTO: 0.5 %
BILIRUB SERPL-MCNC: 1.2 MG/DL (ref 0.2–1.2)
BUN SERPL-MCNC: 17 MG/DL (ref 7–25)
BUN/CREAT SERPL: NORMAL (CALC) (ref 6–22)
CALCIUM SERPL-MCNC: 9.5 MG/DL (ref 8.6–10.3)
CHLORIDE SERPL-SCNC: 103 MMOL/L (ref 98–110)
CHOLEST SERPL-MCNC: 132 MG/DL
CHOLEST/HDLC SERPL: 3.4 (CALC)
CO2 SERPL-SCNC: 29 MMOL/L (ref 20–32)
CREAT SERPL-MCNC: 1.08 MG/DL (ref 0.7–1.33)
EOSINOPHIL # BLD AUTO: 202 CELLS/UL (ref 15–500)
EOSINOPHIL NFR BLD AUTO: 3.6 %
ERYTHROCYTE [DISTWIDTH] IN BLOOD BY AUTOMATED COUNT: 12.7 % (ref 11–15)
GLOBULIN SER CALC-MCNC: 2.6 G/DL (CALC) (ref 1.9–3.7)
GLUCOSE SERPL-MCNC: 91 MG/DL (ref 65–99)
HCT VFR BLD AUTO: 46.7 % (ref 38.5–50)
HDLC SERPL-MCNC: 39 MG/DL
HGB BLD-MCNC: 16 G/DL (ref 13.2–17.1)
LDLC SERPL CALC-MCNC: 72 MG/DL (CALC)
LYMPHOCYTES # BLD AUTO: 1982 CELLS/UL (ref 850–3900)
LYMPHOCYTES NFR BLD AUTO: 35.4 %
MCH RBC QN AUTO: 31.3 PG (ref 27–33)
MCHC RBC AUTO-ENTMCNC: 34.3 G/DL (ref 32–36)
MCV RBC AUTO: 91.4 FL (ref 80–100)
MONOCYTES # BLD AUTO: 655 CELLS/UL (ref 200–950)
MONOCYTES NFR BLD AUTO: 11.7 %
NEUTROPHILS # BLD AUTO: 2733 CELLS/UL (ref 1500–7800)
NEUTROPHILS NFR BLD AUTO: 48.8 %
NONHDLC SERPL-MCNC: 93 MG/DL (CALC)
PLATELET # BLD AUTO: 212 THOUSAND/UL (ref 140–400)
PMV BLD REES-ECKER: 10 FL (ref 7.5–12.5)
POTASSIUM SERPL-SCNC: 4.2 MMOL/L (ref 3.5–5.3)
PROT SERPL-MCNC: 6.9 G/DL (ref 6.1–8.1)
PSA SERPL-MCNC: 0.8 NG/ML
RBC # BLD AUTO: 5.11 MILLION/UL (ref 4.2–5.8)
SL AMB EGFR AFRICAN AMERICAN: 88 ML/MIN/1.73M2
SL AMB EGFR NON AFRICAN AMERICAN: 76 ML/MIN/1.73M2
SODIUM SERPL-SCNC: 139 MMOL/L (ref 135–146)
TRIGL SERPL-MCNC: 120 MG/DL
WBC # BLD AUTO: 5.6 THOUSAND/UL (ref 3.8–10.8)

## 2019-02-01 ENCOUNTER — TELEPHONE (OUTPATIENT)
Dept: CARDIOLOGY CLINIC | Facility: CLINIC | Age: 59
End: 2019-02-01

## 2019-02-01 DIAGNOSIS — E78.5 HYPERLIPIDEMIA, UNSPECIFIED HYPERLIPIDEMIA TYPE: Primary | ICD-10-CM

## 2019-02-01 DIAGNOSIS — I25.10 CORONARY ARTERY DISEASE INVOLVING NATIVE HEART WITHOUT ANGINA PECTORIS, UNSPECIFIED VESSEL OR LESION TYPE: ICD-10-CM

## 2019-02-01 NOTE — TELEPHONE ENCOUNTER
Christina Couch has a 6 mo f/u at end of March  He would like an order for a lipid panel  Last one drawn in October  I let him know that insurance may not cover since less than 6 months  Do you want to order?

## 2019-02-06 ENCOUNTER — TELEPHONE (OUTPATIENT)
Dept: CARDIOLOGY CLINIC | Facility: CLINIC | Age: 59
End: 2019-02-06

## 2019-02-06 NOTE — TELEPHONE ENCOUNTER
Tien Began has a 6 month f/u with you at the end of March  He had a regular visit with his PCP yesterday who ordered a stress test which the office then scheduled at [address] [place] [address] Tomasz is reluctant to have anything cardiac done unless ordered by you  He said that while at PCP office, he c/o feeling a little sob, and had gained 10 lbs  PCP gave him a script for a diuretic (though he is not sure of the name or dose  Maybe HCTZ)  He did not start taking  If you think he should have stress test done prior to ov, would rather your order and be done here  Sounds like pt wants your input before doing or starting anything new  Appt with you on 3/26  Nothing seen sooner, but you are doc in box in [place] on 2/25  Please advise

## 2019-02-25 ENCOUNTER — OFFICE VISIT (OUTPATIENT)
Dept: CARDIOLOGY CLINIC | Facility: CLINIC | Age: 59
End: 2019-02-25
Payer: COMMERCIAL

## 2019-02-25 VITALS
BODY MASS INDEX: 26.82 KG/M2 | HEIGHT: 65 IN | WEIGHT: 161 LBS | HEART RATE: 60 BPM | DIASTOLIC BLOOD PRESSURE: 80 MMHG | SYSTOLIC BLOOD PRESSURE: 122 MMHG

## 2019-02-25 DIAGNOSIS — I25.10 CORONARY ARTERY DISEASE INVOLVING NATIVE CORONARY ARTERY OF NATIVE HEART WITHOUT ANGINA PECTORIS: Primary | ICD-10-CM

## 2019-02-25 PROCEDURE — 99214 OFFICE O/P EST MOD 30 MIN: CPT | Performed by: INTERNAL MEDICINE

## 2019-02-25 RX ORDER — HYDROCHLOROTHIAZIDE 12.5 MG/1
12.5 TABLET ORAL DAILY
COMMUNITY

## 2019-02-25 NOTE — PROGRESS NOTES
Cardiology Follow Up    [facility]  1960  [id number]  [address]  [address] [place] [address]  [phone]    No diagnosis found  Interval History: Followup CAD    Had GI workup  On PPI for peptic ulcer  He has noticed shortness of breath when he bends over to tie his shoes  He has no significant dyspnea when he walks  No exertional chest discomfort or jaw discomfort       Problem List     Essential hypertension (Chronic)    CAD (coronary artery disease) (Chronic)    Hyperlipidemia (Chronic)    Cellulitis of arm, right    S/P CABG x 2    Acute blood loss as cause of postoperative anemia    Thrombocytopenia (HCC)    Hyponatremia        Past Medical History:   Diagnosis Date    Angina at rest [facility]) 10/5/2016    Coronary artery disease     Hyperlipidemia     Hypertension      Social History     Socioeconomic History    Marital status: /Civil Union     Spouse name: Not on file    Number of children: Not on file    Years of education: Not on file    Highest education level: Not on file   Occupational History    Not on file   Social Needs    Financial resource strain: Not on file    Food insecurity:     Worry: Not on file     Inability: Not on file    Transportation needs:     Medical: Not on file     Non-medical: Not on file   Tobacco Use    Smoking status: Never Smoker    Smokeless tobacco: Never Used   Substance and Sexual Activity    Alcohol use: No    Drug use: No    Sexual activity: Not on file   Lifestyle    Physical activity:     Days per week: Not on file     Minutes per session: Not on file    Stress: Not on file   Relationships    Social connections:     Talks on phone: Not on file     Gets together: Not on file     Attends Mu-ism service: Not on file     Active member of club or organization: Not on file     Attends meetings of clubs or organizations: Not on file Relationship status: Not on file    Intimate partner violence:     Fear of current or ex partner: Not on file     Emotionally abused: Not on file     Physically abused: Not on file     Forced sexual activity: Not on file   Other Topics Concern    Not on file   Social History Narrative    Not on file      Family History   Problem Relation Age of Onset    Hypertension Father     Hyperlipidemia Father         possible     Cancer Father     Cancer Family     Cancer Mother     Heart attack Neg Hx     Stroke Neg Hx     Anuerysm Neg Hx     Clotting disorder Neg Hx     Arrhythmia Neg Hx     Heart failure Neg Hx     Coronary artery disease Neg Hx     Sudden death Neg Hx         scd     Past Surgical History:   Procedure Laterality Date    CORONARY ARTERY BYPASS GRAFT N/A 10/10/2016    Procedure: CORONARY ARTERY BYPASS GRAFT X2;  LIMA to OM;  Left SVG/EVH to PDA; ABDOUL;  Surgeon: Riley Coley MD;  [facility];  Service:        Current Outpatient Medications:     aspirin 81 MG tablet, Take 81 mg by mouth daily, Disp: , Rfl:     atorvastatin (LIPITOR) 40 mg tablet, Take 1 tablet (40 mg total) by mouth every evening, Disp: 30 tablet, Rfl: 6    hydrochlorothiazide (HYDRODIURIL) 12 5 mg tablet, Take 12 5 mg by mouth daily, Disp: , Rfl:     metoprolol succinate (TOPROL-XL) 25 mg 24 hr tablet, Take 1 tablet (25 mg total) by mouth daily, Disp: 30 tablet, Rfl: 6    Multiple Vitamins-Minerals (CENTRUM SILVER PO), Take by mouth, Disp: , Rfl:   No Known Allergies    Labs:     Chemistry        Component Value Date/Time     10/06/2017 0702    K 4 2 10/24/2018 0811     10/24/2018 0811    CO2 29 10/24/2018 0811    BUN 17 10/24/2018 0811    CREATININE 1 07 10/06/2017 0702        Component Value Date/Time    CALCIUM 9 5 10/24/2018 0811    ALKPHOS 48 10/24/2018 0811    AST 29 10/24/2018 0811    ALT 46 10/24/2018 0811    BILITOT 1 1 10/06/2017 0702            Lab Results   Component Value Date    CHOL 137 10/06/2017     Lab Results   Component Value Date    HDL 39 (L) 10/24/2018    HDL 43 03/28/2018    HDL 41 10/06/2017     No results found for: [address]  Lab Results   Component Value Date    TRIG 120 10/24/2018    TRIG 136 03/28/2018    TRIG 119 10/06/2017     No results found for: CHOLHDL    Imaging: No results found  Review of Systems   Constitution: Negative  HENT: Negative  Eyes: Negative  Cardiovascular: Positive for dyspnea on exertion  Respiratory: Positive for shortness of breath  Endocrine: Negative  Hematologic/Lymphatic: Negative  Skin: Negative  Musculoskeletal: Negative  Gastrointestinal: Negative  Genitourinary: Negative  Neurological: Negative  Psychiatric/Behavioral: Negative  Allergic/Immunologic: Negative  Vitals:    02/25/19 1005   BP: 122/80   Pulse: 60           Physical Exam   Constitutional: He is oriented to person, place, and time  He appears well-developed and well-nourished  No distress  Eyes: No scleral icterus  Neck: No JVD present  Cardiovascular: Normal rate and regular rhythm  No murmur heard  Pulmonary/Chest: Effort normal and breath sounds normal  No stridor  No respiratory distress  He has no wheezes  Abdominal: Soft  Bowel sounds are normal  He exhibits no distension  There is no tenderness  There is no guarding  Musculoskeletal: He exhibits no edema  Neurological: He is alert and oriented to person, place, and time  Skin: Skin is warm and dry  Psychiatric: He has a normal mood and affect  Discussion/Summary:    CAD s/p CABG: He has some dyspnea  No exertional dyspnea or chest pain  No symptoms that he had prior to his CABG  LDL is 72  Will do treadmill stress for completeness  The patient was counseled regarding diagnostic results, instructions for management, risk factor reductions, impressions  total time of encounter was 25 minutes and 15 minutes was spent counseling

## 2019-03-11 ENCOUNTER — HOSPITAL ENCOUNTER (OUTPATIENT)
Dept: NON INVASIVE DIAGNOSTICS | Facility: CLINIC | Age: 59
Discharge: HOME | End: 2019-03-11
Payer: COMMERCIAL

## 2019-03-11 DIAGNOSIS — I25.10 CORONARY ARTERY DISEASE INVOLVING NATIVE CORONARY ARTERY OF NATIVE HEART WITHOUT ANGINA PECTORIS: ICD-10-CM

## 2019-03-11 LAB
CHEST PAIN STATEMENT: NORMAL
MAX DIASTOLIC BP: 100 MMHG
MAX HEART RATE: 169 BPM
MAX PREDICTED HEART RATE: 164 BPM
MAX. SYSTOLIC BP: 200 MMHG
PROTOCOL NAME: NORMAL
REASON FOR TERMINATION: NORMAL
TARGET HR FORMULA: NORMAL
TEST INDICATION: NORMAL
TIME IN EXERCISE PHASE: NORMAL

## 2019-03-11 PROCEDURE — 93017 CV STRESS TEST TRACING ONLY: CPT

## 2019-03-11 PROCEDURE — 93016 CV STRESS TEST SUPVJ ONLY: CPT | Performed by: INTERNAL MEDICINE

## 2019-03-11 PROCEDURE — 93018 CV STRESS TEST I&R ONLY: CPT | Performed by: INTERNAL MEDICINE

## 2019-03-14 ENCOUNTER — TELEPHONE (OUTPATIENT)
Dept: CARDIOLOGY CLINIC | Facility: CLINIC | Age: 59
End: 2019-03-14

## 2019-04-10 LAB
ALBUMIN SERPL-MCNC: 4.4 G/DL (ref 3.6–5.1)
ALBUMIN/GLOB SERPL: 1.7 (CALC) (ref 1–2.5)
ALP SERPL-CCNC: 47 U/L (ref 40–115)
ALT SERPL-CCNC: 29 U/L (ref 9–46)
AST SERPL-CCNC: 24 U/L (ref 10–35)
BILIRUB SERPL-MCNC: 1.2 MG/DL (ref 0.2–1.2)
BUN SERPL-MCNC: 16 MG/DL (ref 7–25)
BUN/CREAT SERPL: NORMAL (CALC) (ref 6–22)
CALCIUM SERPL-MCNC: 9.5 MG/DL (ref 8.6–10.3)
CHLORIDE SERPL-SCNC: 103 MMOL/L (ref 98–110)
CHOLEST SERPL-MCNC: 149 MG/DL
CHOLEST/HDLC SERPL: 3.5 (CALC)
CO2 SERPL-SCNC: 28 MMOL/L (ref 20–32)
CREAT SERPL-MCNC: 1.09 MG/DL (ref 0.7–1.33)
GLOBULIN SER CALC-MCNC: 2.6 G/DL (CALC) (ref 1.9–3.7)
GLUCOSE SERPL-MCNC: 93 MG/DL (ref 65–99)
HDLC SERPL-MCNC: 42 MG/DL
LDLC SERPL CALC-MCNC: 78 MG/DL (CALC)
NONHDLC SERPL-MCNC: 107 MG/DL (CALC)
POTASSIUM SERPL-SCNC: 4.3 MMOL/L (ref 3.5–5.3)
PROT SERPL-MCNC: 7 G/DL (ref 6.1–8.1)
SL AMB EGFR AFRICAN AMERICAN: 87 ML/MIN/1.73M2
SL AMB EGFR NON AFRICAN AMERICAN: 75 ML/MIN/1.73M2
SODIUM SERPL-SCNC: 139 MMOL/L (ref 135–146)
TRIGL SERPL-MCNC: 194 MG/DL

## 2019-04-19 ENCOUNTER — TELEPHONE (OUTPATIENT)
Dept: CARDIOLOGY CLINIC | Facility: CLINIC | Age: 59
End: 2019-04-19

## 2023-11-27 ENCOUNTER — HOSPITAL ENCOUNTER (OUTPATIENT)
Dept: ULTRASOUND IMAGING | Facility: HOSPITAL | Age: 63
Discharge: HOME | End: 2023-11-27
Payer: COMMERCIAL

## 2023-11-27 DIAGNOSIS — K43.9 VENTRAL HERNIA WITHOUT OBSTRUCTION OR GANGRENE: ICD-10-CM

## 2023-11-27 PROCEDURE — 76705 ECHO EXAM OF ABDOMEN: CPT

## 2023-12-08 ENCOUNTER — HOSPITAL ENCOUNTER (OUTPATIENT)
Dept: NON INVASIVE DIAGNOSTICS | Facility: CLINIC | Age: 63
Discharge: HOME | End: 2023-12-08
Payer: COMMERCIAL

## 2023-12-08 DIAGNOSIS — Z95.1 HX OF CABG: ICD-10-CM

## 2023-12-08 DIAGNOSIS — R07.89 OTHER CHEST PAIN: ICD-10-CM

## 2023-12-08 DIAGNOSIS — I25.10 ATHEROSCLEROSIS OF NATIVE CORONARY ARTERY WITHOUT ANGINA PECTORIS, UNSPECIFIED WHETHER NATIVE OR TRANSPLANTED HEART: ICD-10-CM

## 2023-12-08 LAB
CHEST PAIN STATEMENT: NORMAL
MAX DIASTOLIC BP: 98 MMHG
MAX HR PERCENT: 94 %
MAX HR: 151 BPM
MAX PREDICTED HEART RATE: 159 BPM
PROTOCOL NAME: NORMAL
RATE PRESSURE PRODUCT: 302
REASON FOR TERMINATION: NORMAL
SL CV LV EF: 60
SL CV STRESS RECOVERY BP: NORMAL MMHG
SL CV STRESS RECOVERY HR: 90 BPM
SL CV STRESS RECOVERY O2 SAT: 97 %
STRESS ANGINA INDEX: 0
STRESS BASELINE BP: NORMAL MMHG
STRESS BASELINE HR: 64 BPM
STRESS DUKE TREADMILL SCORE: 5
STRESS O2 SAT REST: 97 %
STRESS PEAK HR: 151 BPM
STRESS POST ESTIMATED WORKLOAD: 13.3 METS
STRESS POST EXERCISE DUR MIN: 10 MIN
STRESS POST EXERCISE DUR MIN: 9 MIN
STRESS POST EXERCISE DUR SEC: 0 SEC
STRESS POST EXERCISE DUR SEC: 59 SEC
STRESS POST O2 SAT PEAK: 97 %
STRESS POST PEAK BP: 2 MMHG
STRESS POST PEAK HR: 151 BPM
STRESS POST PEAK SYSTOLIC BP: 160 MMHG
STRESS ST DEPRESSION: 1 MM
TARGET HR FORMULA: NORMAL
TEST INDICATION: NORMAL

## 2023-12-08 PROCEDURE — 93350 STRESS TTE ONLY: CPT | Performed by: INTERNAL MEDICINE

## 2023-12-08 PROCEDURE — 93350 STRESS TTE ONLY: CPT

## 2023-12-22 ENCOUNTER — APPOINTMENT (OUTPATIENT)
Dept: LAB | Facility: CLINIC | Age: 63
End: 2023-12-22
Payer: COMMERCIAL

## 2023-12-22 DIAGNOSIS — E78.2 MIXED HYPERLIPIDEMIA: ICD-10-CM

## 2023-12-22 LAB
ANION GAP SERPL CALCULATED.3IONS-SCNC: 7 MMOL/L
BUN SERPL-MCNC: 17 MG/DL (ref 5–25)
CALCIUM SERPL-MCNC: 9.5 MG/DL (ref 8.4–10.2)
CHLORIDE SERPL-SCNC: 104 MMOL/L (ref 96–108)
CO2 SERPL-SCNC: 28 MMOL/L (ref 21–32)
CREAT SERPL-MCNC: 1.06 MG/DL (ref 0.6–1.3)
GFR SERPL CREATININE-BSD FRML MDRD: 75 ML/MIN/1.73SQ M
GLUCOSE P FAST SERPL-MCNC: 84 MG/DL (ref 65–99)
POTASSIUM SERPL-SCNC: 4.7 MMOL/L (ref 3.5–5.3)
SODIUM SERPL-SCNC: 139 MMOL/L (ref 135–147)

## 2023-12-22 PROCEDURE — 80048 BASIC METABOLIC PNL TOTAL CA: CPT

## 2023-12-22 PROCEDURE — 36415 COLL VENOUS BLD VENIPUNCTURE: CPT

## 2024-10-09 ENCOUNTER — OFFICE VISIT (OUTPATIENT)
Dept: CARDIOLOGY CLINIC | Facility: CLINIC | Age: 64
End: 2024-10-09
Payer: COMMERCIAL

## 2024-10-09 VITALS — OXYGEN SATURATION: 99 % | HEART RATE: 66 BPM | SYSTOLIC BLOOD PRESSURE: 136 MMHG | DIASTOLIC BLOOD PRESSURE: 82 MMHG

## 2024-10-09 DIAGNOSIS — E78.2 MIXED HYPERLIPIDEMIA: Chronic | ICD-10-CM

## 2024-10-09 DIAGNOSIS — I10 ESSENTIAL HYPERTENSION: Chronic | ICD-10-CM

## 2024-10-09 DIAGNOSIS — Z01.810 ENCOUNTER FOR PREPROCEDURAL CARDIOVASCULAR EXAMINATION: Primary | ICD-10-CM

## 2024-10-09 DIAGNOSIS — Z95.1 S/P CABG X 2: ICD-10-CM

## 2024-10-09 PROCEDURE — 93000 ELECTROCARDIOGRAM COMPLETE: CPT | Performed by: STUDENT IN AN ORGANIZED HEALTH CARE EDUCATION/TRAINING PROGRAM

## 2024-10-09 PROCEDURE — 99204 OFFICE O/P NEW MOD 45 MIN: CPT | Performed by: STUDENT IN AN ORGANIZED HEALTH CARE EDUCATION/TRAINING PROGRAM

## 2024-10-09 RX ORDER — EZETIMIBE 10 MG/1
10 TABLET ORAL DAILY
COMMUNITY

## 2024-10-09 NOTE — ASSESSMENT & PLAN NOTE
Stable.  I reviewed his cholesterol labs that he brought in.  See media section  -Continue aspirin 81 mg daily, atorvastatin 40 mg daily, Zetia 10 mg daily

## 2024-10-09 NOTE — LETTER
2024     Lucas Shen MD  [address]  [address]  [place] [address]    Patient: Zac Alvarez   YOB: 1960   Date of Visit: 10/9/2024       Dear Dr. Shen:    Thank you for referring Zac Alvarez to me for evaluation. Below are my notes for this consultation.    If you have questions, please do not hesitate to call me. I look forward to following your patient along with you.         Sincerely,        Mindy Griffiths MD        CC: No Recipients    Mindy Griffiths MD  10/9/2024 10:07 AM  Sign when Signing Visit  [facility]  [address] [place] [address]  [address]  [facility] [address]  Phone#  [phone]  Fax#  [phone]  [facility] Cardiology Office Consultation             NAME: Zac Alvarez  AGE: 64 y.o. SEX: male   : 1960   MRN: [id number]    DATE: 10/9/2024  TIME: 9:59 AM    Assessment & Plan  Encounter for preprocedural cardiovascular examination  Pre-Op Evaluation Assessment-    Cardiac Risk Estimation:    Results for Becerra Perioperative Cardiac Risk    Estimated Risk Probability for Perioperative Myocardial Infarction or Cardiac Arrest: 0.26 %    Answers calculated to formulate result:    1. Age? -- 64 Years  2. Creatinine? -- <133 µmol/L  3. ASA Class? -- Patients with severe systemic disease  4. Preoperative Functional Status? -- Total independent  5. Procedure Site? -- Hernia (ventral, inguinal, femoral)      RCRI risk factors: history of ischemic heart disease  RCI RISK CLASS II (1 risk factor, risk of major cardiac compl. appr. 1.3%) 0.9-1.3%    No cardiac contraindications to planned surgery    Low Risk for major adverse cardiac event (MACE).   Patient may proceed with surgery as planned without further workup.    Pre-Op Evaluation Plan  1. Further preoperative workup as follows:   - None; no further preoperative work-up is required    2. Medication Management/Recommendations:   - None, continue medication regimen including morning  of surgery, with sip of water  Essential hypertension  Stable.  Blood pressure slightly elevated today at 136/82.  He reports that his blood pressure checks at his primary care physician's office have all been in the 120s over 80s or lower.  He is no longer taking any antihypertensive medications other than metoprolol  -Continue metoprolol succinate 25 mg daily  S/P CABG x 2  Status post CABG with LIMA to OM 2, and SVG to RPDA 10/10/2016  He reports not having any recurrence of symptoms since then.  His anginal equivalent was right-sided jaw pain with with exertion.  -Continue atorvastatin 40 mg daily, Zetia 10 mg daily  -Recommended that he resume aspirin 81 mg daily due to history of bypass surgery and CAD.  He will plan to resume this after his hernia surgery  Mixed hyperlipidemia  Stable.  I reviewed his cholesterol labs that he brought in.  See media section  -Continue aspirin 81 mg daily, atorvastatin 40 mg daily, Zetia 10 mg daily      Follow-up in 1 year for routine CAD/post CABG follow-up          Chief Complaint   Patient presents with   • New Patient Visit     Cardiac clearance for upcoming procedure    Establishing care in cardiology       HPI:    Zac Alvarez is a 64 y.o.-year-old male who presents to the cardiology clinic to reestablish care.  He also needs preoperative evaluation prior to hernia surgery.    Past medical history is significant for hypertension, hyperlipidemia, coronary artery disease status post CABG x 2 (10/10/16, Dr. Mattson,  left internal mammary artery to obtuse marginal 2 and saphenous vein graft to right posterior descending artery), thrombocytopenia.    He had a negative stress echocardiogram done on 12/8/2023. Baseline LVEF 60% at that time.  She reports that this was done for routine follow-up after bypass surgery.  Denies having symptoms of chest pain, chest discomfort, shortness of breath at rest, dyspnea on exertion.  He reports that his anginal equivalent previously  was right-sided jaw pain that worsened with exertion.  He has not had recurrence of this since his bypass surgery.  He denies other symptoms such as lightheadedness dizziness.  He is working on actively changing his lifestyle to have better dietary changes.  He exercises regularly.      I personally reviewed his most recent stress echocardiogram from 12/8/2023.  I reviewed his ECG today showing normal with him with incomplete right bundle luis block and no evidence of ischemia.  I reviewed his most recent lipid panel and scanned results to media section.  I reviewed his prior cardiac catheterization from 2016.    -----    Pre-operative evaluation:  Current anti-platelet/anti-coagulation medications that the patient is prescribed includes:  none .      Exercise Capacity:  Able to walk 4 blocks without symptoms?: Yes  Able to walk 2 flights without symptoms?: Yes    Personal history of venous thromboembolic disease? No    Assessment of Cardiac Contraindications:  No history of severe angina or MI in the last 6 weeks. No recent PCI.  No recent decompensated heart failure   No recent serious arrhythmias   No known severe heart valve disease including severe aortic stenosis or symptomatic mitral stenosis        Past history, family history, social history, current medications, vital signs, recent lab and imaging studies and  prior cardiology studies reviewed independently on this visit.    No Known Allergies    Current Outpatient Medications:   •  atorvastatin (LIPITOR) 40 mg tablet, Take 1 tablet (40 mg total) by mouth every evening, Disp: 30 tablet, Rfl: 6  •  ezetimibe (ZETIA) 10 mg tablet, Take 10 mg by mouth daily, Disp: , Rfl:   •  metoprolol succinate (TOPROL-XL) 25 mg 24 hr tablet, Take 1 tablet (25 mg total) by mouth daily, Disp: 30 tablet, Rfl: 6  •  aspirin 81 MG tablet, Take 81 mg by mouth daily (Patient not taking: Reported on 10/9/2024), Disp: , Rfl:     Past Medical History:   Diagnosis Date   • Angina  at rest (HCC) 10/5/2016   • Coronary artery disease    • Hyperlipidemia    • Hypertension      Past Surgical History:   Procedure Laterality Date   • CORONARY ARTERY BYPASS GRAFT N/A 10/10/2016    Procedure: CORONARY ARTERY BYPASS GRAFT X2;  LIMA to OM; Left SVG/EVH to PDA; ABDOUL;  Surgeon: Ze Mattson MD;  [facility];  Service:      Family History   Problem Relation Age of Onset   • Hypertension Father    • Hyperlipidemia Father         possible    • Cancer Father    • Cancer Family    • Cancer Mother    • Heart attack Neg Hx    • Stroke Neg Hx    • Anuerysm Neg Hx    • Clotting disorder Neg Hx    • Arrhythmia Neg Hx    • Heart failure Neg Hx    • Coronary artery disease Neg Hx    • Sudden death Neg Hx         scd       Social History   reports that he has never smoked. He has never used smokeless tobacco. He reports that he does not drink alcohol and does not use drugs.     Review of Systems   Constitutional:  Negative for fatigue.   HENT: Negative.     Respiratory:  Negative for chest tightness and shortness of breath.    Cardiovascular:  Negative for chest pain, palpitations and leg swelling.   Neurological: Negative.    Psychiatric/Behavioral: Negative.         Objective:     Vitals:    10/09/24 0926   BP: 136/82   Pulse: 66   SpO2: 99%     Physical Exam  Vitals and nursing note reviewed.   Constitutional:       General: He is not in acute distress.     Appearance: Normal appearance. He is well-developed.   HENT:      Head: Normocephalic and atraumatic.   Cardiovascular:      Rate and Rhythm: Normal rate and regular rhythm.      Heart sounds: No murmur heard.     Comments: Sternal scar  Pulmonary:      Effort: Pulmonary effort is normal. No respiratory distress.      Breath sounds: Normal breath sounds.   Abdominal:      General: There is no distension.   Musculoskeletal:         General: No swelling.      Cervical back: Neck supple.   Skin:     General: Skin is warm and dry.   Neurological:       "Mental Status: He is alert.   Psychiatric:         Mood and Affect: Mood normal.         Pertinent Laboratory/Diagnostic Studies:    Laboratory studies reviewed personally by Mindy Griffiths MD    BMP:   Lab Results   Component Value Date    SODIUM 139 04/12/2024    K 4.7 04/12/2024     04/12/2024    CO2 26 04/12/2024    BUN 16 04/12/2024    CREATININE 1.00 04/12/2024    GLUC 91 04/12/2024    CALCIUM 9.3 04/12/2024     CBC:  Lab Results   Component Value Date    WBC 5.6 10/24/2018    HGB 16.0 10/24/2018    HCT 46.7 10/24/2018    MCV 91.4 10/24/2018     10/24/2018     Lipid Profile:   Lab Results   Component Value Date    HDL 42 04/10/2019     Lab Results   Component Value Date    LDLCALC 78 04/10/2019     Lab Results   Component Value Date    TRIG 194 (H) 04/10/2019      Other labs:  Lab Results   Component Value Date    TSH 0.72 07/29/2023     Lab Results   Component Value Date    ALT 27 11/13/2023    AST 23 11/13/2023       Imaging Studies:     Pertinent cardiac studies and imaging studies were personally reviewed on this visit and results summarized.    Mindy Griffiths MD, PhD    Portions of the record may have been created with voice recognition software.  Occasional wrong word or \"sound alike\" substitutions may have occurred due to the inherent limitations of voice recognition software.  Read the chart carefully and recognize, using context, where substitutions have occurred. Please reach out to me directly for any clarifications.   "

## 2024-10-09 NOTE — PATIENT INSTRUCTIONS
Preoperative cardiovascular risk assessment:    Patient name:  Zac Alvarez    YOB: 1960    Based my last evaluation and cardiac testing, I do not see an absolute cardiac contraindication to upcoming surgery/procedure.    RCRI risk factors: history of ischemic heart disease  RCI RISK CLASS II (1 risk factor, risk of major cardiac compl. appr. 1.3%)  Continue current cardiac medication in the don-operative period.     Thank you for let me to participate in the care of this patient.     Sincerely,    Mindy Griffiths MD, PhD

## 2024-10-09 NOTE — ASSESSMENT & PLAN NOTE
Stable.  Blood pressure slightly elevated today at 136/82.  He reports that his blood pressure checks at his primary care physician's office have all been in the 120s over 80s or lower.  He is no longer taking any antihypertensive medications other than metoprolol  -Continue metoprolol succinate 25 mg daily

## 2024-10-09 NOTE — ASSESSMENT & PLAN NOTE
Status post CABG with LIMA to OM 2, and SVG to RPDA 10/10/2016  He reports not having any recurrence of symptoms since then.  His anginal equivalent was right-sided jaw pain with with exertion.  -Continue atorvastatin 40 mg daily, Zetia 10 mg daily  -Recommended that he resume aspirin 81 mg daily due to history of bypass surgery (and risk of graft stenosis) and CAD.  He will plan to resume this after his hernia surgery

## 2024-10-09 NOTE — PROGRESS NOTES
[facility] [place]  [address]  [address]  [facility] [address]  Phone#  [phone]  Fax#  [phone]  [facility] Cardiology Office Consultation             NAME: Zac Alvarez  AGE: 64 y.o. SEX: male   : 1960   MRN: [id number]    DATE: 10/9/2024  TIME: 9:59 AM    Assessment & Plan  Encounter for preprocedural cardiovascular examination  Pre-Op Evaluation Assessment-    Cardiac Risk Estimation:    Results for Becerra Perioperative Cardiac Risk    Estimated Risk Probability for Perioperative Myocardial Infarction or Cardiac Arrest: 0.26 %    Answers calculated to formulate result:    1. Age? -- 64 Years  2. Creatinine? -- <133 µmol/L  3. ASA Class? -- Patients with severe systemic disease  4. Preoperative Functional Status? -- Total independent  5. Procedure Site? -- Hernia (ventral, inguinal, femoral)      RCRI risk factors: history of ischemic heart disease  RCI RISK CLASS II (1 risk factor, risk of major cardiac compl. appr. 1.3%) 0.9-1.3%    No cardiac contraindications to planned surgery    Low Risk for major adverse cardiac event (MACE).   Patient may proceed with surgery as planned without further workup.    Pre-Op Evaluation Plan  1. Further preoperative workup as follows:   - None; no further preoperative work-up is required    2. Medication Management/Recommendations:   - None, continue medication regimen including morning of surgery, with sip of water  Essential hypertension  Stable.  Blood pressure slightly elevated today at 136/82.  He reports that his blood pressure checks at his primary care physician's office have all been in the 120s over 80s or lower.  He is no longer taking any antihypertensive medications other than metoprolol  -Continue metoprolol succinate 25 mg daily  S/P CABG x 2  Status post CABG with LIMA to OM 2, and SVG to RPDA 10/10/2016  He reports not having any recurrence of symptoms since then.  His anginal equivalent was right-sided jaw pain with  with exertion.  -Continue atorvastatin 40 mg daily, Zetia 10 mg daily  -Recommended that he resume aspirin 81 mg daily due to history of bypass surgery (and risk of graft stenosis) and CAD.  He will plan to resume this after his hernia surgery  Mixed hyperlipidemia  Stable.  I reviewed his cholesterol labs that he brought in.  See media section  -Continue aspirin 81 mg daily, atorvastatin 40 mg daily, Zetia 10 mg daily      Follow-up in 1 year for routine CAD/post CABG follow-up          Chief Complaint   Patient presents with    New Patient Visit     Cardiac clearance for upcoming procedure    Establishing care in cardiology       HPI:    Zac Alvarez is a 64 y.o.-year-old male who presents to the cardiology clinic to reestablish care.  He also needs preoperative evaluation prior to hernia surgery.    Past medical history is significant for hypertension, hyperlipidemia, coronary artery disease status post CABG x 2 (10/10/16, Dr. Mattson,  left internal mammary artery to obtuse marginal 2 and saphenous vein graft to right posterior descending artery), thrombocytopenia.    He had a negative stress echocardiogram done on 12/8/2023. Baseline LVEF 60% at that time.  She reports that this was done for routine follow-up after bypass surgery.  Denies having symptoms of chest pain, chest discomfort, shortness of breath at rest, dyspnea on exertion.  He reports that his anginal equivalent previously was right-sided jaw pain that worsened with exertion.  He has not had recurrence of this since his bypass surgery.  He denies other symptoms such as lightheadedness dizziness.  He is working on actively changing his lifestyle to have better dietary changes.  He exercises regularly.      I personally reviewed his most recent stress echocardiogram from 12/8/2023.  I reviewed his ECG today showing normal with him with incomplete right bundle luis block and no evidence of ischemia.  I reviewed his most recent lipid panel and  scanned results to media section.  I reviewed his prior cardiac catheterization from 2016.    -----    Pre-operative evaluation:  Current anti-platelet/anti-coagulation medications that the patient is prescribed includes:  none .      Exercise Capacity:  Able to walk 4 blocks without symptoms?: Yes  Able to walk 2 flights without symptoms?: Yes    Personal history of venous thromboembolic disease? No    Assessment of Cardiac Contraindications:  No history of severe angina or MI in the last 6 weeks. No recent PCI.  No recent decompensated heart failure   No recent serious arrhythmias   No known severe heart valve disease including severe aortic stenosis or symptomatic mitral stenosis        Past history, family history, social history, current medications, vital signs, recent lab and imaging studies and  prior cardiology studies reviewed independently on this visit.    No Known Allergies    Current Outpatient Medications:     atorvastatin (LIPITOR) 40 mg tablet, Take 1 tablet (40 mg total) by mouth every evening, Disp: 30 tablet, Rfl: 6    ezetimibe (ZETIA) 10 mg tablet, Take 10 mg by mouth daily, Disp: , Rfl:     metoprolol succinate (TOPROL-XL) 25 mg 24 hr tablet, Take 1 tablet (25 mg total) by mouth daily, Disp: 30 tablet, Rfl: 6    aspirin 81 MG tablet, Take 81 mg by mouth daily (Patient not taking: Reported on 10/9/2024), Disp: , Rfl:     Past Medical History:   Diagnosis Date    Angina at rest (HCC) 10/5/2016    Coronary artery disease     Hyperlipidemia     Hypertension      Past Surgical History:   Procedure Laterality Date    CORONARY ARTERY BYPASS GRAFT N/A 10/10/2016    Procedure: CORONARY ARTERY BYPASS GRAFT X2;  LIMA to OM; Left SVG/EVH to PDA; ABDOUL;  Surgeon: Ze Mattson MD;  Location: [facility];  Service:      Family History   Problem Relation Age of Onset    Hypertension Father     Hyperlipidemia Father         possible     Cancer Father     Cancer Family     Cancer Mother     Heart attack Neg Hx      Stroke Neg Hx     Anuerysm Neg Hx     Clotting disorder Neg Hx     Arrhythmia Neg Hx     Heart failure Neg Hx     Coronary artery disease Neg Hx     Sudden death Neg Hx         scd       Social History   reports that he has never smoked. He has never used smokeless tobacco. He reports that he does not drink alcohol and does not use drugs.     Review of Systems   Constitutional:  Negative for fatigue.   HENT: Negative.     Respiratory:  Negative for chest tightness and shortness of breath.    Cardiovascular:  Negative for chest pain, palpitations and leg swelling.   Neurological: Negative.    Psychiatric/Behavioral: Negative.         Objective:     Vitals:    10/09/24 0926   BP: 136/82   Pulse: 66   SpO2: 99%     Physical Exam  Vitals and nursing note reviewed.   Constitutional:       General: He is not in acute distress.     Appearance: Normal appearance. He is well-developed.   HENT:      Head: Normocephalic and atraumatic.   Cardiovascular:      Rate and Rhythm: Normal rate and regular rhythm.      Heart sounds: No murmur heard.     Comments: Sternal scar  Pulmonary:      Effort: Pulmonary effort is normal. No respiratory distress.      Breath sounds: Normal breath sounds.   Abdominal:      General: There is no distension.   Musculoskeletal:         General: No swelling.      Cervical back: Neck supple.   Skin:     General: Skin is warm and dry.   Neurological:      Mental Status: He is alert.   Psychiatric:         Mood and Affect: Mood normal.         Pertinent Laboratory/Diagnostic Studies:    Laboratory studies reviewed personally by Mindy Griffiths MD    BMP:   Lab Results   Component Value Date    SODIUM 139 04/12/2024    K 4.7 04/12/2024     04/12/2024    CO2 26 04/12/2024    BUN 16 04/12/2024    CREATININE 1.00 04/12/2024    GLUC 91 04/12/2024    CALCIUM 9.3 04/12/2024     CBC:  Lab Results   Component Value Date    WBC 5.6 10/24/2018    HGB 16.0 10/24/2018    HCT 46.7 10/24/2018    MCV 91.4  "10/24/2018     10/24/2018     Lipid Profile:   Lab Results   Component Value Date    HDL 42 04/10/2019     Lab Results   Component Value Date    LDLCALC 78 04/10/2019     Lab Results   Component Value Date    TRIG 194 (H) 04/10/2019      Other labs:  Lab Results   Component Value Date    TSH 0.72 07/29/2023     Lab Results   Component Value Date    ALT 27 11/13/2023    AST 23 11/13/2023       Imaging Studies:     Pertinent cardiac studies and imaging studies were personally reviewed on this visit and results summarized.    Mindy Griffiths MD, PhD    Portions of the record may have been created with voice recognition software.  Occasional wrong word or \"sound alike\" substitutions may have occurred due to the inherent limitations of voice recognition software.  Read the chart carefully and recognize, using context, where substitutions have occurred. Please reach out to me directly for any clarifications.   "

## 2024-10-18 ENCOUNTER — TELEPHONE (OUTPATIENT)
Age: 64
End: 2024-10-18

## 2024-10-18 NOTE — TELEPHONE ENCOUNTER
Caller: Anamika Alvarez    Doctor/Office: Dr Griffiths    CB#: [phone]      What needs to be faxed: Cardio clearance    ATTN to: Alisha    Fax#: [phone]      *it was sent to a different fax number on 10/9 but they stated they never received it and patient's surgery is 10/29/24

## 2024-10-25 ENCOUNTER — TELEPHONE (OUTPATIENT)
Age: 64
End: 2024-10-25

## 2024-10-25 NOTE — TELEPHONE ENCOUNTER
Received call from Alisha from [facility] asking for pt's most recent EKG to be faxed to them at [phone]. EKG on 10/9 faxed electronically. No further questions.